# Patient Record
Sex: MALE | Race: WHITE | Employment: FULL TIME | URBAN - METROPOLITAN AREA
[De-identification: names, ages, dates, MRNs, and addresses within clinical notes are randomized per-mention and may not be internally consistent; named-entity substitution may affect disease eponyms.]

---

## 2019-01-24 ENCOUNTER — OFFICE VISIT (OUTPATIENT)
Dept: FAMILY MEDICINE CLINIC | Age: 34
End: 2019-01-24

## 2019-01-24 VITALS
HEIGHT: 69 IN | BODY MASS INDEX: 24.32 KG/M2 | DIASTOLIC BLOOD PRESSURE: 60 MMHG | HEART RATE: 52 BPM | WEIGHT: 164.2 LBS | SYSTOLIC BLOOD PRESSURE: 100 MMHG | OXYGEN SATURATION: 98 % | RESPIRATION RATE: 12 BRPM | TEMPERATURE: 98.6 F

## 2019-01-24 DIAGNOSIS — F11.21 HEROIN USE DISORDER, MODERATE, IN EARLY REMISSION (HCC): ICD-10-CM

## 2019-01-24 DIAGNOSIS — F17.200 TOBACCO DEPENDENCE: ICD-10-CM

## 2019-01-24 DIAGNOSIS — F90.9 HYPERACTIVITY: ICD-10-CM

## 2019-01-24 DIAGNOSIS — B19.20 HEPATITIS C VIRUS INFECTION WITHOUT HEPATIC COMA, UNSPECIFIED CHRONICITY: ICD-10-CM

## 2019-01-24 DIAGNOSIS — R41.840 INATTENTION: Primary | ICD-10-CM

## 2019-01-24 RX ORDER — BUPROPION HYDROCHLORIDE 150 MG/1
150 TABLET, EXTENDED RELEASE ORAL 2 TIMES DAILY
Qty: 60 TAB | Refills: 3 | Status: SHIPPED | OUTPATIENT
Start: 2019-01-24

## 2019-01-24 NOTE — PROGRESS NOTES
Ana Wheatley is a 29 y.o. male who was seen in clinic today (1/24/2019). Assessment & Plan:       ICD-10-CM ICD-9-CM    1. Inattention R41.840 799.51 REFERRAL TO PSYCHOLOGY      CBC WITH AUTOMATED DIFF      METABOLIC PANEL, COMPREHENSIVE      TSH W/ REFLX FREE T4 IF ABNORMAL      DRUG SCREEN, URINE   2. Hyperactivity F90.9 314.9 REFERRAL TO PSYCHOLOGY      CBC WITH AUTOMATED DIFF      METABOLIC PANEL, COMPREHENSIVE      TSH W/ REFLX FREE T4 IF ABNORMAL      DRUG SCREEN, URINE   3. Hepatitis C virus infection without hepatic coma, unspecified chronicity W76.13 520.42 METABOLIC PANEL, COMPREHENSIVE      HCV RT-PCR, QUANT (NON-GRAPH)   4. Heroin use disorder, moderate, in early remission (HCC) F11.21 305.53    5. Tobacco dependence F17.200 305.1 buPROPion SR (WELLBUTRIN SR) 150 mg SR tablet       Hx supports stated dx. Looking for documentation at home. Referral for definitive assessment should those records be unobtainable. Agree with plan to avoid controlled substances if ADD confirmed. Office controlled substance agreement given for review    Heroin use disorder reportedly in remission: congrats. Continue management strategy    Hep c: status? Though requested, Juan Castaneda did not provide urine for UDS today        Follow-up Disposition:  Return for inattention after consultation or records available, (30). Subjective:   Cherri Celaya was seen today for Nicotine Dependence (Santa is asking for assitance with smoking cessaton and is asking for Wellbutrin.) and Attention Deficit Disorder (Was not able to get his previous records thinks he may have a copy at his parents home and will look for these.)      ADHD:   Childhood: no irritability, no short temper, did well in school. Diagnosed in retrospect age 22-23 once younger brother diagnosed prompting revisit of behavior:  \"You can always tell if I've been in the kitchen because any cabinet I've interacted with is open. \" Once left the fridge open. Goes off on tangents in conversation  Can't sit still or stand still  Often relieves impulses with chewing inside of cheek     Short acting stimulant: \"made me sedate\" \"I'm a very animated person\" \"It changed me\"  Wasn't in school at that time, medication wasn't needed as was able to employ coping skills to address non academic work expectations    Now, however, \"I need to write more, unless I write myself a 100 post it notes\" will forget obligations. Wants to manage time better in prep for going back to school in the fall    Requesting non controlled substance like Strattera    history of heroin use: clean x 7 months =\"hiccup\" from prior 11/7/2015 clean date: [facility] rehab, now in 12 step with sponsor    history of hep C: Has not been treated - levels has always been low and not warranting treatment. Tested HIV 10 months ago. No needle sharing. 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 Mom did well with bupropion. Requesting same. No prior trial pharma tob cessation tools       Reports not available for review at the time of our visit.         Patient Care Team:  Vidya Arevalo MD as PCP - [facility])  No Known Allergies  Outpatient Medications Marked as Taking for the 1/24/19 encounter (Office Visit) with Vidya Arevalo MD   Medication Sig Dispense Refill   None    Patient Active Problem List    Diagnosis    Hepatitis C virus infection without hepatic coma    Heroin use disorder, moderate, in early remission ([facility] [address]     11/7/2015 clean date: [facility] rehab, now in 12 step with sponsor, \"hiccup\" June 2018        Tobacco dependence    ADD (attention deficit disorder with hyperactivity)     Past Medical History:   Diagnosis Date    ADD (attention deficit disorder with hyperactivity)     Hepatitis C     Heroin use      Past Surgical History:   Procedure Laterality Date    HX ADENOIDECTOMY  1991    HX WISDOM TEETH EXTRACTION       Family History   Problem Relation Age of Onset    Elevated Lipids Father     Diabetes Maternal Grandmother      Social History     Socioeconomic History    Marital status: SINGLE     Spouse name: Not on file    Number of children: Not on file    Years of education: Not on file    Highest education level: Not on file   Social Needs    Financial resource strain: Not on file    Food insecurity - worry: Not on file    Food insecurity - inability: Not on file   [employer] needs - medical: Not on file   [employer] needs - non-medical: Not on file   Occupational History    Occupation: /   Tobacco Use    Smoking status: Current Every Day Smoker    Smokeless tobacco: Never Used   Substance and Sexual Activity    Alcohol use: Yes     Comment: \"RARELY\"    Drug use: Not on file    Sexual activity: Not on file   Other Topics Concern    Not on file   Social History Narrative    ** Merged History Encounter **                  Review of Systems   Constitutional: Negative for fever, malaise/fatigue and weight loss. Respiratory: Negative for cough, hemoptysis, shortness of breath and wheezing. Cardiovascular: Negative for chest pain, palpitations, orthopnea, leg swelling and PND. Gastrointestinal: Negative for abdominal pain, blood in stool, diarrhea, melena, nausea and vomiting. Genitourinary: Negative for dysuria and hematuria. Neurological: Negative for sensory change, speech change, focal weakness, seizures and headaches. Objective:     Visit Vitals  /60   Pulse (!) 52   Temp 98.6 °F (37 °C) (Oral)   Resp 12   Ht 5' 9\" (1.753 m)   Wt 164 lb 3.2 oz (74.5 kg)   SpO2 98%   BMI 24.25 kg/m²      Physical Exam   Constitutional: He is oriented to person, place, and time. He appears well-developed and well-nourished. No distress. HENT:   Head: Normocephalic and atraumatic.    Right Ear: External ear normal. Left Ear: External ear normal.   Mouth/Throat: Oropharynx is clear and moist.   Eyes: Conjunctivae are normal. No scleral icterus. Neck: Normal range of motion. Neck supple. No thyromegaly present. Cardiovascular: Normal rate, regular rhythm, normal heart sounds and intact distal pulses. Exam reveals no gallop and no friction rub. No murmur heard. Pulmonary/Chest: Effort normal and breath sounds normal. No respiratory distress. He has no wheezes. He has no rales. Abdominal: Soft. Bowel sounds are normal. He exhibits no distension and no mass. There is no hepatosplenomegaly. There is no tenderness. Musculoskeletal: Normal range of motion. He exhibits no edema. Lymphadenopathy:     He has no cervical adenopathy. Neurological: He is alert and oriented to person, place, and time. Skin: Skin is warm and dry. Lots of body art   Psychiatric: He has a normal mood and affect. His speech is normal. Judgment and thought content normal.   Tapping feet or hands throughout exam   Nursing note and vitals reviewed. Disclaimer: The patient understands our medical plan. Alternatives have been explained and offered. The risks, benefits and significant side effects of all medications have been reviewed. Anticipated time course and progression of condition reviewed. All questions have been addressed. He is encouraged to employ the information provided in the after visit summary, which was reviewed. Where appropriate, he is instructed to call the clinic if he has not been notified either by phone or through [address] with the results of his tests or with an appointment plan for any referrals within 1 week(s). No news is not good news; it's no news. The patient  is to call if his condition worsens or fails to improve or if significant side effects are experienced. Aspects of this note may have been generated using voice recognition software. Despite editing, there may be unrecognized errors. Zully Guerrero MD

## 2019-01-24 NOTE — PROGRESS NOTES
Chief Complaint   Patient presents with    Nicotine Dependence     Santa is asking for assitance with smoking cessaton and is asking for Wellbutrin.  Attention Deficit Disorder     Was not able to get his previous records thinks he may have a copy at his parents home and will look for these. Medication reconciliation has been completed with patient. Care team discussed/updated as well as pharmacy. Health Maintenance reviewed - Pending review of previous records.

## 2019-02-04 ENCOUNTER — TELEPHONE (OUTPATIENT)
Dept: FAMILY MEDICINE CLINIC | Age: 34
End: 2019-02-04

## 2019-02-04 NOTE — TELEPHONE ENCOUNTER
He never followed through with the labs I ordered, including UDS. I've yet to see the prior records he said his parents had. I suspect we will not see him again. Please notify Dr. Jimbo Nogueira, as well as that you've sent him a letter.  DOUGLAS

## 2019-02-04 NOTE — TELEPHONE ENCOUNTER
Dr. Sung Sampson called stating that he has tried to  contact  The patient Several times, but keep getting his voicemail.

## 2019-02-04 NOTE — TELEPHONE ENCOUNTER
Called Wilton Celaya  1985 on  No answer was unable to leave message due to the number we have no longer excepting calls. Letter printed and will be sent.     [facility]

## 2019-02-04 NOTE — LETTER
2/4/2019 1:29 PM 
 
Mr. Fawn Figueroa [address] [address] Dear Mr. Celaya: 
 
We've missed you! We have been trying to contact you in regards to your referral and have been unable to reach you. Please call our office at [phone] as soon as you can. Thank you.  
 
 
Sincerely, 
 
 
Carlos Kolb MD

## 2020-09-06 ENCOUNTER — HOSPITAL ENCOUNTER (EMERGENCY)
Age: 35
Discharge: HOME | End: 2020-09-06
Attending: EMERGENCY MEDICINE

## 2020-09-06 VITALS
DIASTOLIC BLOOD PRESSURE: 90 MMHG | TEMPERATURE: 98.1 F | SYSTOLIC BLOOD PRESSURE: 108 MMHG | HEART RATE: 97 BPM | RESPIRATION RATE: 16 BRPM | OXYGEN SATURATION: 100 %

## 2020-09-06 DIAGNOSIS — T50.901A ACCIDENTAL OVERDOSE, INITIAL ENCOUNTER: Primary | ICD-10-CM

## 2020-09-06 PROCEDURE — 99284 EMERGENCY DEPT VISIT MOD MDM: CPT

## 2020-09-06 RX ORDER — NALOXONE HYDROCHLORIDE 4 MG/.1ML
SPRAY NASAL
Qty: 1 EACH | Refills: 0 | Status: SHIPPED | OUTPATIENT
Start: 2020-09-06

## 2020-09-06 NOTE — ED TRIAGE NOTES
Patient A/O x 4, brought into ED by [facility] with complaint of heroine overdose. As per medic, patient was found in car unresponsive. Patient admits to taking heroine intravenously, and normal dose. Upon medics arrival, patient received narcan 4 mg IN. Patient immediately became arousable. Patient denies any complaints at this time. Denies SI/HI.

## 2020-09-06 NOTE — ED PROVIDER NOTES
EMERGENCY DEPARTMENT HISTORY AND PHYSICAL EXAM  This was created with voice recognition software and transcription errors may be present. 12:13 AM  Date: 9/6/2020  Patient Name: Isha Branch    History of Presenting Illness     Chief Complaint:    History Provided By:     HPI: Isha Branch is a 30 y.o. male past medical history of ADD hep C heroin use presents with accidental overdose. Patient was found in a car unresponsive minimal respirations given Narcan with complete resolution. He is awake and alert without complaint      PCP: Tommy Estrada MD      Past History     Past Medical History:  Past Medical History:   Diagnosis Date    ADD (attention deficit disorder with hyperactivity)     Hepatitis C     Heroin use        Past Surgical History:  Past Surgical History:   Procedure Laterality Date    HX ADENOIDECTOMY  1991    HX WISDOM TEETH EXTRACTION         Family History:  Family History   Problem Relation Age of Onset    Elevated Lipids Father     Diabetes Maternal Grandmother        Social History:  Social History     Tobacco Use    Smoking status: Current Every Day Smoker    Smokeless tobacco: Never Used   Substance Use Topics    Alcohol use: Yes     Comment: \"RARELY\"    Drug use: Not on file       Allergies:  No Known Allergies    Review of Systems     Review of Systems   All other systems reviewed and are negative. 10 point review of systems otherwise negative unless noted in HPI. Physical Exam       Physical Exam  Constitutional:       Appearance: He is well-developed. HENT:      Head: Normocephalic and atraumatic. Eyes:      Pupils: Pupils are equal, round, and reactive to light. Neck:      Musculoskeletal: Normal range of motion and neck supple. Cardiovascular:      Rate and Rhythm: Normal rate and regular rhythm. Heart sounds: Normal heart sounds. No murmur. No friction rub. Pulmonary:      Effort: Pulmonary effort is normal. No respiratory distress. Breath sounds: Normal breath sounds. No wheezing. Abdominal:      General: There is no distension. Palpations: Abdomen is soft. Tenderness: There is no abdominal tenderness. There is no guarding or rebound. Musculoskeletal: Normal range of motion. Skin:     General: Skin is warm and dry. Neurological:      Mental Status: He is alert and oriented to person, place, and time. Psychiatric:         Behavior: Behavior normal.         Thought Content: Thought content normal.         Diagnostic Study Results     Vital Signs  EKG:  Labs:   Imaging:     Medical Decision Making     ED Course: Progress Notes, Reevaluation, and Consults:      Provider Notes (Medical Decision Making):   Accidental overdose will hobs and discharge       Diagnosis     Clinical Impression: No diagnosis found. Disposition:    Patient's Medications   Start Taking    No medications on file   Continue Taking    BUPROPION SR (WELLBUTRIN SR) 150 MG SR TABLET    Take 1 Tab by mouth two (2) times a day. Start with 1 tab daily for 3 days.    These Medications have changed    No medications on file   Stop Taking    No medications on file

## 2020-09-06 NOTE — DISCHARGE INSTRUCTIONS
Patient Education        Alcohol, Drug, or Poison Ingestion: Care Instructions  Your Care Instructions     A person can become very sick, or die, from swallowing or using alcohol, drugs, or poisons. Alcohol poisoning occurs when a person drinks a large amount of alcohol. Alcohol can stop nerve signals that control breathing. It can also stop the gag reflex that prevents choking. Alcohol poisoning is serious. It can lead to brain damage or death if it's not treated right away. Drugs can be used by accident or on purpose. They can be swallowed, inhaled, injected, or absorbed through the skin. Drugs include over-the-counter medicine (such as aspirin or acetaminophen) and prescription medicine. They also include vitamins and supplements. And they include illegal drugs such as cocaine and heroin. And poisons are all around us. They include household , cosmetics, houseplants, and garden chemicals. The doctor has checked you carefully, but problems can develop later. If you notice any problems or new symptoms, get medical treatment right away. Follow-up care is a key part of your treatment and safety. Be sure to make and go to all appointments, and call your doctor if you are having problems. It's also a good idea to know your test results and keep a list of the medicines you take. How can you care for yourself at home? Alcohol problems  · Talk to your doctor or counselor about programs that can help you stop using alcohol. · Plan ways to avoid being tempted to drink. ? Get rid of all alcohol in your home. ? Avoid places where you tend to drink. ? Stay away from places or events that offer alcohol. ? Stay away from people who drink a lot. Drug problems  · Talk to your doctor about programs that can help you stop using drugs. · Get rid of any drugs you might be tempted to misuse. · Learn how to say no when other people use drugs. · Don't spend time with people who use drugs.   Poison prevention  · Keep products in the containers they came in. Keep them with the original labels. · Be careful when you use cleaning products, paints, solvents, and pesticides. Read labels before use. Use a fan to move strong odors and fumes out of your home. · Do not mix cleaning products. Try to use nontoxic . These include vinegar, lemon juice, and baking soda. When should you call for help? Poison control centers, hospitals, or your doctor can give immediate advice in the case of a poisoning. The [employer] number is [phone]. Have the poison container with you so you can give complete information to the poison control center, such as what the poison or substance is, how much was taken and when. Do not try to make the person vomit. Call 911 anytime you think you may need emergency care. For example, call if you or someone else:    · Has used or currently uses alcohol or drugs and is very confused or can't stay awake.     · Has passed out (lost consciousness).     · Has severe trouble breathing.     · Is having a seizure. Call your doctor now or seek immediate medical care if you or someone else:    · Has new symptoms, or is not acting normally. Watch closely for changes in your health, and be sure to contact your doctor if:    · You do not get better as expected.     · You need help with drug or alcohol problems.     · You have problems with depression or other mental health issues. Where can you learn more? Go to http://www.gray.com/  Enter A385 in the search box to learn more about \"Alcohol, Drug, or Poison Ingestion: Care Instructions. \"  Current as of: June 26, 2019               Content Version: 12.6  © 9862-8934 [employer]. Care instructions adapted under license by [employer] (which disclaims liability or warranty for this information).  If you have questions about a medical condition or this instruction, always ask your healthcare professional. [place] [address] any warranty or liability for your use of this information.